# Patient Record
Sex: FEMALE | Race: WHITE | NOT HISPANIC OR LATINO | Employment: FULL TIME | URBAN - METROPOLITAN AREA
[De-identification: names, ages, dates, MRNs, and addresses within clinical notes are randomized per-mention and may not be internally consistent; named-entity substitution may affect disease eponyms.]

---

## 2020-10-08 PROBLEM — N94.6 DYSMENORRHEA: Status: ACTIVE | Noted: 2020-10-08

## 2020-10-08 PROBLEM — Z01.419 WOMEN'S ANNUAL ROUTINE GYNECOLOGICAL EXAMINATION: Status: ACTIVE | Noted: 2020-10-08

## 2020-10-08 PROBLEM — N94.10 DYSPAREUNIA IN FEMALE: Status: ACTIVE | Noted: 2020-10-08

## 2020-10-08 PROBLEM — N92.0 MENORRHAGIA WITH REGULAR CYCLE: Status: ACTIVE | Noted: 2020-10-08

## 2020-10-28 PROBLEM — N81.4 UTERINE PROLAPSE: Status: ACTIVE | Noted: 2020-10-28

## 2020-11-06 ENCOUNTER — HOSPITAL ENCOUNTER (OUTPATIENT)
Dept: PREADMISSION TESTING | Facility: HOSPITAL | Age: 25
Discharge: HOME | End: 2020-11-06
Attending: OBSTETRICS & GYNECOLOGY
Payer: MEDICAID

## 2020-11-06 DIAGNOSIS — Z01.818 PREOP TESTING: Primary | ICD-10-CM

## 2020-11-06 NOTE — PRE-PROCEDURE INSTRUCTIONS
PAT done. Pt ambulatory, alert and oriented. Discussed pre, postop, and discharge instructions. hibicleanse given with instructions. Npo after mn. Need . Arrive at main entrance.  Cov[facility] Labs ekg today. Anesthesia unavailable.  Pt escorted to lab. No questions or concerns at this time.o700 arrival time

## 2020-11-09 ENCOUNTER — ANESTHESIA (OUTPATIENT)
Dept: SURGERY | Facility: HOSPITAL | Age: 25
End: 2020-11-09
Payer: MEDICAID

## 2020-11-09 ENCOUNTER — ANESTHESIA EVENT (OUTPATIENT)
Dept: SURGERY | Facility: HOSPITAL | Age: 25
End: 2020-11-09
Payer: MEDICAID

## 2020-11-09 ENCOUNTER — HOSPITAL ENCOUNTER (OUTPATIENT)
Facility: HOSPITAL | Age: 25
Discharge: HOME | End: 2020-11-10
Attending: OBSTETRICS & GYNECOLOGY | Admitting: OBSTETRICS & GYNECOLOGY
Payer: MEDICAID

## 2020-11-09 DIAGNOSIS — N94.6 DYSMENORRHEA: ICD-10-CM

## 2020-11-09 DIAGNOSIS — Z01.818 PREOP TESTING: ICD-10-CM

## 2020-11-09 DIAGNOSIS — R10.2 PELVIC PAIN: Primary | ICD-10-CM

## 2020-11-09 LAB — B-HCG UR QL: NEGATIVE

## 2020-11-09 PROCEDURE — 63600175 PHARM REV CODE 636 W HCPCS

## 2020-11-09 PROCEDURE — 63600175 PHARM REV CODE 636 W HCPCS: Performed by: ANESTHESIOLOGY

## 2020-11-09 PROCEDURE — 63600175 PHARM REV CODE 636 W HCPCS: Performed by: OBSTETRICS & GYNECOLOGY

## 2020-11-09 PROCEDURE — 00840 ANES IPER PX LOWER ABD NOS: CPT | Performed by: OBSTETRICS & GYNECOLOGY

## 2020-11-09 PROCEDURE — 88307 TISSUE EXAM BY PATHOLOGIST: CPT | Mod: 26,,, | Performed by: PATHOLOGY

## 2020-11-09 PROCEDURE — 71000039 HC RECOVERY, EACH ADD'L HOUR: Performed by: OBSTETRICS & GYNECOLOGY

## 2020-11-09 PROCEDURE — 36000710: Performed by: OBSTETRICS & GYNECOLOGY

## 2020-11-09 PROCEDURE — D9220A PRA ANESTHESIA: ICD-10-PCS | Mod: ,,, | Performed by: ANESTHESIOLOGY

## 2020-11-09 PROCEDURE — 81025 URINE PREGNANCY TEST: CPT

## 2020-11-09 PROCEDURE — 99900103 DSU ONLY-NO CHARGE-INITIAL HR (STAT)

## 2020-11-09 PROCEDURE — 88307 PR  SURG PATH,LEVEL V: ICD-10-PCS | Mod: 26,,, | Performed by: PATHOLOGY

## 2020-11-09 PROCEDURE — 94799 UNLISTED PULMONARY SVC/PX: CPT

## 2020-11-09 PROCEDURE — 63600175 PHARM REV CODE 636 W HCPCS: Performed by: NURSE ANESTHETIST, CERTIFIED REGISTERED

## 2020-11-09 PROCEDURE — 37000009 HC ANESTHESIA EA ADD 15 MINS: Performed by: OBSTETRICS & GYNECOLOGY

## 2020-11-09 PROCEDURE — 25000003 PHARM REV CODE 250: Performed by: OBSTETRICS & GYNECOLOGY

## 2020-11-09 PROCEDURE — 87086 URINE CULTURE/COLONY COUNT: CPT

## 2020-11-09 PROCEDURE — 36000711: Performed by: OBSTETRICS & GYNECOLOGY

## 2020-11-09 PROCEDURE — 25000003 PHARM REV CODE 250: Performed by: NURSE ANESTHETIST, CERTIFIED REGISTERED

## 2020-11-09 PROCEDURE — 88307 TISSUE EXAM BY PATHOLOGIST: CPT | Performed by: PATHOLOGY

## 2020-11-09 PROCEDURE — 71000033 HC RECOVERY, INTIAL HOUR: Performed by: OBSTETRICS & GYNECOLOGY

## 2020-11-09 PROCEDURE — 37000008 HC ANESTHESIA 1ST 15 MINUTES: Performed by: OBSTETRICS & GYNECOLOGY

## 2020-11-09 PROCEDURE — D9220A PRA ANESTHESIA: Mod: ,,, | Performed by: ANESTHESIOLOGY

## 2020-11-09 PROCEDURE — 27201423 OPTIME MED/SURG SUP & DEVICES STERILE SUPPLY: Performed by: OBSTETRICS & GYNECOLOGY

## 2020-11-09 RX ORDER — SODIUM CHLORIDE, SODIUM LACTATE, POTASSIUM CHLORIDE, CALCIUM CHLORIDE 600; 310; 30; 20 MG/100ML; MG/100ML; MG/100ML; MG/100ML
INJECTION, SOLUTION INTRAVENOUS CONTINUOUS
Status: DISCONTINUED | OUTPATIENT
Start: 2020-11-09 | End: 2020-11-10 | Stop reason: HOSPADM

## 2020-11-09 RX ORDER — SODIUM CHLORIDE, SODIUM LACTATE, POTASSIUM CHLORIDE, CALCIUM CHLORIDE 600; 310; 30; 20 MG/100ML; MG/100ML; MG/100ML; MG/100ML
INJECTION, SOLUTION INTRAVENOUS CONTINUOUS
Status: DISCONTINUED | OUTPATIENT
Start: 2020-11-09 | End: 2020-11-09

## 2020-11-09 RX ORDER — PROPOFOL 10 MG/ML
VIAL (ML) INTRAVENOUS
Status: DISCONTINUED | OUTPATIENT
Start: 2020-11-09 | End: 2020-11-09

## 2020-11-09 RX ORDER — ONDANSETRON 4 MG/1
8 TABLET, ORALLY DISINTEGRATING ORAL EVERY 8 HOURS PRN
Status: DISCONTINUED | OUTPATIENT
Start: 2020-11-09 | End: 2020-11-10 | Stop reason: HOSPADM

## 2020-11-09 RX ORDER — CEFAZOLIN SODIUM 2 G/50ML
2 SOLUTION INTRAVENOUS
Status: COMPLETED | OUTPATIENT
Start: 2020-11-09 | End: 2020-11-09

## 2020-11-09 RX ORDER — KETOROLAC TROMETHAMINE 30 MG/ML
30 INJECTION, SOLUTION INTRAMUSCULAR; INTRAVENOUS EVERY 6 HOURS
Status: DISCONTINUED | OUTPATIENT
Start: 2020-11-09 | End: 2020-11-10 | Stop reason: HOSPADM

## 2020-11-09 RX ORDER — SODIUM CHLORIDE, SODIUM LACTATE, POTASSIUM CHLORIDE, CALCIUM CHLORIDE 600; 310; 30; 20 MG/100ML; MG/100ML; MG/100ML; MG/100ML
125 INJECTION, SOLUTION INTRAVENOUS CONTINUOUS
Status: DISCONTINUED | OUTPATIENT
Start: 2020-11-09 | End: 2020-11-09

## 2020-11-09 RX ORDER — MEPERIDINE HYDROCHLORIDE 50 MG/ML
INJECTION INTRAMUSCULAR; INTRAVENOUS; SUBCUTANEOUS
Status: DISCONTINUED | OUTPATIENT
Start: 2020-11-09 | End: 2020-11-09

## 2020-11-09 RX ORDER — AMOXICILLIN 250 MG
1 CAPSULE ORAL 2 TIMES DAILY
Status: DISCONTINUED | OUTPATIENT
Start: 2020-11-09 | End: 2020-11-10 | Stop reason: HOSPADM

## 2020-11-09 RX ORDER — MORPHINE SULFATE 4 MG/ML
6 INJECTION, SOLUTION INTRAMUSCULAR; INTRAVENOUS EVERY 4 HOURS PRN
Status: DISCONTINUED | OUTPATIENT
Start: 2020-11-09 | End: 2020-11-10 | Stop reason: HOSPADM

## 2020-11-09 RX ORDER — BISACODYL 10 MG
10 SUPPOSITORY, RECTAL RECTAL DAILY PRN
Status: DISCONTINUED | OUTPATIENT
Start: 2020-11-09 | End: 2020-11-10 | Stop reason: HOSPADM

## 2020-11-09 RX ORDER — LIDOCAINE HYDROCHLORIDE 10 MG/ML
1 INJECTION, SOLUTION EPIDURAL; INFILTRATION; INTRACAUDAL; PERINEURAL ONCE
Status: DISCONTINUED | OUTPATIENT
Start: 2020-11-09 | End: 2020-11-09 | Stop reason: HOSPADM

## 2020-11-09 RX ORDER — SUCCINYLCHOLINE CHLORIDE 20 MG/ML
INJECTION INTRAMUSCULAR; INTRAVENOUS
Status: DISCONTINUED | OUTPATIENT
Start: 2020-11-09 | End: 2020-11-09

## 2020-11-09 RX ORDER — IPRATROPIUM BROMIDE 0.5 MG/2.5ML
0.5 SOLUTION RESPIRATORY (INHALATION) EVERY 6 HOURS
Status: DISCONTINUED | OUTPATIENT
Start: 2020-11-09 | End: 2020-11-09 | Stop reason: HOSPADM

## 2020-11-09 RX ORDER — MUPIROCIN 20 MG/G
1 OINTMENT TOPICAL 2 TIMES DAILY
Status: DISCONTINUED | OUTPATIENT
Start: 2020-11-09 | End: 2020-11-10 | Stop reason: HOSPADM

## 2020-11-09 RX ORDER — ONDANSETRON 2 MG/ML
8 INJECTION INTRAMUSCULAR; INTRAVENOUS ONCE
Status: COMPLETED | OUTPATIENT
Start: 2020-11-09 | End: 2020-11-09

## 2020-11-09 RX ORDER — MIDAZOLAM HYDROCHLORIDE 1 MG/ML
INJECTION, SOLUTION INTRAMUSCULAR; INTRAVENOUS
Status: DISCONTINUED | OUTPATIENT
Start: 2020-11-09 | End: 2020-11-09

## 2020-11-09 RX ORDER — ONDANSETRON 2 MG/ML
INJECTION INTRAMUSCULAR; INTRAVENOUS
Status: COMPLETED
Start: 2020-11-09 | End: 2020-11-09

## 2020-11-09 RX ORDER — BUPIVACAINE HYDROCHLORIDE AND EPINEPHRINE 2.5; 5 MG/ML; UG/ML
INJECTION, SOLUTION EPIDURAL; INFILTRATION; INTRACAUDAL; PERINEURAL
Status: DISCONTINUED | OUTPATIENT
Start: 2020-11-09 | End: 2020-11-09 | Stop reason: HOSPADM

## 2020-11-09 RX ORDER — ONDANSETRON 2 MG/ML
INJECTION INTRAMUSCULAR; INTRAVENOUS
Status: DISCONTINUED | OUTPATIENT
Start: 2020-11-09 | End: 2020-11-09

## 2020-11-09 RX ORDER — OXYCODONE AND ACETAMINOPHEN 5; 325 MG/1; MG/1
1 TABLET ORAL
Status: DISCONTINUED | OUTPATIENT
Start: 2020-11-09 | End: 2020-11-09 | Stop reason: HOSPADM

## 2020-11-09 RX ORDER — ONDANSETRON 2 MG/ML
4 INJECTION INTRAMUSCULAR; INTRAVENOUS DAILY PRN
Status: DISCONTINUED | OUTPATIENT
Start: 2020-11-09 | End: 2020-11-09 | Stop reason: HOSPADM

## 2020-11-09 RX ORDER — ROCURONIUM BROMIDE 10 MG/ML
INJECTION, SOLUTION INTRAVENOUS
Status: DISCONTINUED | OUTPATIENT
Start: 2020-11-09 | End: 2020-11-09

## 2020-11-09 RX ORDER — IBUPROFEN 600 MG/1
600 TABLET ORAL EVERY 6 HOURS
Status: DISCONTINUED | OUTPATIENT
Start: 2020-11-10 | End: 2020-11-10 | Stop reason: HOSPADM

## 2020-11-09 RX ORDER — MORPHINE SULFATE 4 MG/ML
2 INJECTION, SOLUTION INTRAMUSCULAR; INTRAVENOUS EVERY 5 MIN PRN
Status: DISCONTINUED | OUTPATIENT
Start: 2020-11-09 | End: 2020-11-09 | Stop reason: HOSPADM

## 2020-11-09 RX ORDER — CEFAZOLIN SODIUM 2 G/50ML
SOLUTION INTRAVENOUS
Status: COMPLETED
Start: 2020-11-09 | End: 2020-11-09

## 2020-11-09 RX ORDER — MORPHINE SULFATE 4 MG/ML
8 INJECTION, SOLUTION INTRAMUSCULAR; INTRAVENOUS
Status: DISCONTINUED | OUTPATIENT
Start: 2020-11-09 | End: 2020-11-10 | Stop reason: HOSPADM

## 2020-11-09 RX ADMIN — MIDAZOLAM HYDROCHLORIDE 1 MG: 1 INJECTION, SOLUTION INTRAMUSCULAR; INTRAVENOUS at 07:11

## 2020-11-09 RX ADMIN — ROCURONIUM BROMIDE 5 MG: 10 INJECTION, SOLUTION INTRAVENOUS at 09:11

## 2020-11-09 RX ADMIN — SUCCINYLCHOLINE CHLORIDE 120 MG: 20 INJECTION, SOLUTION INTRAMUSCULAR; INTRAVENOUS at 07:11

## 2020-11-09 RX ADMIN — MEPERIDINE HYDROCHLORIDE 10 MG: 50 INJECTION INTRAMUSCULAR; INTRAVENOUS; SUBCUTANEOUS at 07:11

## 2020-11-09 RX ADMIN — SODIUM CHLORIDE, POTASSIUM CHLORIDE, SODIUM LACTATE AND CALCIUM CHLORIDE: 600; 310; 30; 20 INJECTION, SOLUTION INTRAVENOUS at 08:11

## 2020-11-09 RX ADMIN — PROPOFOL 150 MG: 10 INJECTION, EMULSION INTRAVENOUS at 07:11

## 2020-11-09 RX ADMIN — MORPHINE SULFATE 2 MG: 4 INJECTION INTRAVENOUS at 11:11

## 2020-11-09 RX ADMIN — ONDANSETRON 8 MG: 2 INJECTION INTRAMUSCULAR; INTRAVENOUS at 04:11

## 2020-11-09 RX ADMIN — MEPERIDINE HYDROCHLORIDE 15 MG: 50 INJECTION INTRAMUSCULAR; INTRAVENOUS; SUBCUTANEOUS at 08:11

## 2020-11-09 RX ADMIN — ROCURONIUM BROMIDE 10 MG: 10 INJECTION, SOLUTION INTRAVENOUS at 08:11

## 2020-11-09 RX ADMIN — SUGAMMADEX 200 MG: 100 INJECTION, SOLUTION INTRAVENOUS at 09:11

## 2020-11-09 RX ADMIN — CEFAZOLIN SODIUM 2 G: 2 SOLUTION INTRAVENOUS at 07:11

## 2020-11-09 RX ADMIN — MORPHINE SULFATE 4 MG: 4 INJECTION INTRAVENOUS at 10:11

## 2020-11-09 RX ADMIN — PROPOFOL 50 MG: 10 INJECTION, EMULSION INTRAVENOUS at 07:11

## 2020-11-09 RX ADMIN — SODIUM CHLORIDE, POTASSIUM CHLORIDE, SODIUM LACTATE AND CALCIUM CHLORIDE: 600; 310; 30; 20 INJECTION, SOLUTION INTRAVENOUS at 07:11

## 2020-11-09 RX ADMIN — MEPERIDINE HYDROCHLORIDE 25 MG: 50 INJECTION INTRAMUSCULAR; INTRAVENOUS; SUBCUTANEOUS at 09:11

## 2020-11-09 RX ADMIN — ROCURONIUM BROMIDE 20 MG: 10 INJECTION, SOLUTION INTRAVENOUS at 08:11

## 2020-11-09 RX ADMIN — PROMETHAZINE HYDROCHLORIDE 12.5 MG: 25 INJECTION INTRAMUSCULAR; INTRAVENOUS at 11:11

## 2020-11-09 RX ADMIN — ONDANSETRON 4 MG: 2 INJECTION INTRAMUSCULAR; INTRAVENOUS at 10:11

## 2020-11-09 RX ADMIN — SODIUM CHLORIDE, SODIUM LACTATE, POTASSIUM CHLORIDE, AND CALCIUM CHLORIDE: .6; .31; .03; .02 INJECTION, SOLUTION INTRAVENOUS at 04:11

## 2020-11-09 RX ADMIN — KETOROLAC TROMETHAMINE 30 MG: 30 INJECTION, SOLUTION INTRAMUSCULAR at 11:11

## 2020-11-09 RX ADMIN — MORPHINE SULFATE 6 MG: 4 INJECTION, SOLUTION INTRAMUSCULAR; INTRAVENOUS at 01:11

## 2020-11-09 RX ADMIN — ONDANSETRON HYDROCHLORIDE 8 MG: 2 SOLUTION INTRAMUSCULAR; INTRAVENOUS at 04:11

## 2020-11-09 RX ADMIN — ONDANSETRON 4 MG: 2 INJECTION INTRAMUSCULAR; INTRAVENOUS at 09:11

## 2020-11-09 RX ADMIN — MUPIROCIN 1 G: 20 OINTMENT TOPICAL at 10:11

## 2020-11-09 NOTE — HPI
24 yo   Pessary improved the pain   Now on menses w increased discomfort  Does not like using pessary w insertion and removal        emb extensive breakdown  Us 1.2 cm polyp  Wbc 9.4 hct 33, pap nl , gc chlneg, affirm neg  Menorrhagia, dysmenorrhea and dysparunia.  Sp cs x3  No tob

## 2020-11-09 NOTE — PLAN OF CARE
Patient arrives to PACU via bed resting quietly. monitor connected. PIV intact and infusing LR'S to gravity. Incision sites to abdomen x 4 bandage in place clean and dry. Ernandez cath in place secured to left thigh. Bear hugger at bedside. VS'S, No complaints, will continue to monitor.

## 2020-11-09 NOTE — OP NOTE
Surgery Date: 11/9/2020      Surgeon(s) and Role:     * Jhonny Rebollar MD - Primary     * Pam Whittington MD - Assisting           Pre-op Diagnosis:  Dysmenorrhea [N94.6]  Dyspareunia in female [N94.10]  Menorrhagia with regular cycle [N92.0]  Uterine prolapse [N81.4]     Post-op Diagnosis:  Post-Op Diagnosis Codes:     * Dysmenorrhea [N94.6]     * Dyspareunia in female [N94.10]     * Menorrhagia with regular cycle [N92.0]     * Uterine prolapse [N81.4]     Procedure(s) (LRB):  HYSTERECTOMY, VAGINAL, LAPAROSCOPY-ASSISTED (N/A)  CYSTOSCOPY (N/A)     Anesthesia: Choice     Description of Procedure: tlh, bilateral salpingectomy, cystoscopy     Description of the findings of the procedure: normal pelvic anatomy, liver and appendix.  Normal cystoscopic exam of the bladder     Estimated Blood Loss: 200 mL     Estimated Blood Loss has not been documented. EBL = 200.         Specimens: uterus w cervix, bilateral tubes      After proper consents were obtained the patient was taken to the operating given general anesthesia through endotracheal intubation prepped and draped in the usual fashion for laparoscopic hysterectomy.  She was placed in dorsal lithotomy position in Guillermo stirrups  Ernandez was placed in the bladder  Ten tooth tenaculum grasped the anterior lip of the cervix, uterus sounded to 10 cm, and a 10 cm fornices device with a 5 cm cup was then placed into the uterus and onto the cervix  An incision was made in the midclavicular line just beneath the costochondral margin on the left side  The abdomen was insufflated with CO2  No adhesions to the abdominal wall anteriorly were noted  10 mm incision was made with a scalpel at the umbilicus  A 10 mm scope was placed at the umbilicus  Under direct visualization 5 mm trocars were placed in the left and the right lower quadrant staying lateral to the inferior epigastric vessels  Liver appendix cul-de-sac tubes and ovaries were normal  Prior tubal ligation was noted  bilaterally  Ovaries were normal  The mesial salpinx was sequentially cauterized and cut with LigaSure device  And cross incised and removed separately through the trocar sleeves  The round ligaments were cauterized and cut  The rectovaginal space was opened under blunt dissection  Anterior leaf and posterior leaf of the broad ligament were sequentially cauterized and cut to the lower uterine segment  Uterine vessels were sequentially cauterized and cut with LigaSure device  Uterine arteries were triply cauterized and cut with LigaSure device   there was minimal adhesions on the vesicouterine fold  The bladder was taken down by cauterization  After the bladder was instilled with 180 cc of air through the Ernandez  To identify its demarcation on the lower uterine segment  The air was released from the bladder  And a circumferential incision was made at the cervical vaginal junction using the fornices  Cup as a line of demarcation  The specimen was removed from the vagina  The vaginal mucosa was closed with 0 Vicryl running interlocking fashion and imbricated with 0 Vicryl  The abdomen was reinsufflated  Pedicles were hemostatic  Surgicel was placed on the vaginal cuff  Abdomen was deinsufflated primary and secondary trocars were removed under direct visualization  Subumbilical incision suture was 0 Vicryl at the fascial level  Skin closed with 4 Vicryl  Left needle sponge instrument needle count correct x2

## 2020-11-09 NOTE — BRIEF OP NOTE
[facility] - Periop Services  Brief Operative Note    SUMMARY     Surgery Date: 11/9/2020     Surgeon(s) and Role:     * Jhonny Rebollar MD - Primary     * Pam Whittington MD - Assisting        Pre-op Diagnosis:  Dysmenorrhea [N94.6]  Dyspareunia in female [N94.10]  Menorrhagia with regular cycle [N92.0]  Uterine prolapse [N81.4]    Post-op Diagnosis:  Post-Op Diagnosis Codes:     * Dysmenorrhea [N94.6]     * Dyspareunia in female [N94.10]     * Menorrhagia with regular cycle [N92.0]     * Uterine prolapse [N81.4]    Procedure(s) (LRB):  HYSTERECTOMY, VAGINAL, LAPAROSCOPY-ASSISTED (N/A)  CYSTOSCOPY (N/A)    Anesthesia: Choice    Description of Procedure: tlh, bilateral salpingectomy, cystoscopy    Description of the findings of the procedure: normal pelvic anatomy, liver and appendix.  Normal cystoscopic exam of the bladder    Estimated Blood Loss: 200 mL    Estimated Blood Loss has not been documented. EBL = 200.         Specimens: uterus w cervix, bilateral tubes  Specimen (12h ago, onward)    None          [id number]

## 2020-11-09 NOTE — ANESTHESIA PREPROCEDURE EVALUATION
11/09/2020  Krystin Centeno is a 25 y.o., female.    Anesthesia Evaluation    I have reviewed the Patient Summary Reports.    I have reviewed the Nursing Notes.    I have reviewed the Medications.     Review of Systems  Anesthesia Hx:  No problems with previous Anesthesia  Neg history of prior surgery. Denies Family Hx of Anesthesia complications.   Denies Personal Hx of Anesthesia complications.   Social:  Non-Smoker    Hematology/Oncology:  Hematology Normal   Oncology Normal     EENT/Dental:EENT/Dental Normal   Cardiovascular:  Cardiovascular Normal     Pulmonary:  Pulmonary Normal    Renal/:  Renal/ Normal     Hepatic/GI:  Hepatic/GI Normal    Musculoskeletal:  Musculoskeletal Normal    Neurological:  Neurology Normal    Endocrine:  Endocrine Normal    Dermatological:  Skin Normal    Psych:  Psychiatric Normal           Physical Exam  General:  Obesity    Airway/Jaw/Neck:  Airway Findings: Mouth Opening: Normal Tongue: Normal  General Airway Assessment: Adult  Mallampati: II  TM Distance: 4 - 6 cm        Eyes/Ears/Nose:  EYES/EARS/NOSE FINDINGS: Normal   Dental:  DENTAL FINDINGS: Normal   Chest/Lungs:  Chest/Lungs Clear    Heart/Vascular:  Heart Findings: Normal Heart murmur: negative Vascular Findings: Normal    Abdomen:  Abdomen Findings: Normal    Musculoskeletal:  Musculoskeletal Findings: Normal   Skin:  Skin Findings: Normal    Mental Status:  Mental Status Findings: Normal        Anesthesia Plan  Type of Anesthesia, risks & benefits discussed:  Anesthesia Type:  general  Patient's Preference:   Intra-op Monitoring Plan: standard ASA monitors  Intra-op Monitoring Plan Comments:   Post Op Pain Control Plan:   Post Op Pain Control Plan Comments:   Induction:   IV  Beta Blocker:  Patient is not currently on a Beta-Blocker (No further documentation required).       Informed Consent: Patient  understands risks and agrees with Anesthesia plan.  Questions answered. Anesthesia consent signed with patient.  ASA Score: 2     Day of Surgery Review of History & Physical: I have interviewed and examined the patient. I have reviewed the patient's H&P dated:    H&P update referred to the provider.         Ready For Surgery From Anesthesia Perspective.

## 2020-11-09 NOTE — ANESTHESIA PROCEDURE NOTES
Intubation  Performed by: Sweta Aguilar CRNA  Authorized by: Shashank Ortiz MD     Intubation:     Induction:  Rapid sequence induction    Intubated:  Postinduction    Mask Ventilation:  Easy mask    Attempts:  1    Attempted By:  CRNA    Method of Intubation:  Direct    Blade:  Other (see comments) (bowman 2)    Laryngeal View Grade: Grade I - full view of chords      Difficult Airway Encountered?: No      Complications:  None    Airway Device:  Oral endotracheal tube    Airway Device Size:  7.0    Style/Cuff Inflation:  Cuffed (inflated to minimal occlusive pressure)    Tube secured:  21    Secured at:  The lips    Placement Verified By:  Capnometry    Complicating Factors:  Obesity    Findings Post-Intubation:  Atraumatic/condition of teeth unchanged and BS equal bilateral

## 2020-11-09 NOTE — ANESTHESIA POSTPROCEDURE EVALUATION
Anesthesia Post Evaluation    Patient: Krystin Centeno    Procedure(s) Performed: Procedure(s) (LRB):  HYSTERECTOMY, VAGINAL, LAPAROSCOPY-ASSISTED (N/A)  CYSTOSCOPY (N/A)    Final Anesthesia Type: general    Patient location during evaluation: PACU  Patient participation: Yes- Able to Participate  Level of consciousness: awake and awake and alert  Post-procedure vital signs: reviewed and stable  Pain management: adequate  Airway patency: patent    PONV status at discharge: No PONV  Anesthetic complications: no      Cardiovascular status: blood pressure returned to baseline  Respiratory status: unassisted and spontaneous ventilation  Hydration status: euvolemic  Follow-up not needed.          Vitals Value Taken Time   /68 11/09/20 1101   Temp 36.5 °C (97.7 °F) 11/09/20 1010   Pulse 79 11/09/20 1104   Resp 16 11/09/20 1104   SpO2 95 % 11/09/20 1104   Vitals shown include unvalidated device data.      No case tracking events are documented in the log.      Pain/Dora Score: Pain Rating Prior to Med Admin: 5 (11/9/2020  1:44 PM)  Pain Rating Post Med Admin: 5 (11/9/2020  1:00 PM)  Dora Score: 10 (11/9/2020 11:00 AM)

## 2020-11-09 NOTE — SUBJECTIVE & OBJECTIVE
No current facility-administered medications on file prior to encounter.      Current Outpatient Medications on File Prior to Encounter   Medication Sig    iron-vitamin C 100-250 mg, ICAR-C, 100-250 mg Tab Take 1 tablet by mouth every morning.    diazePAM (VALIUM) 5 MG tablet Take 1 tablet (5 mg total) by mouth every 8 (eight) hours as needed for Anxiety or Insomnia.    HYDROcodone-acetaminophen (NORCO) 5-325 mg per tablet Take 1 tablet by mouth every 4 (four) hours as needed for Pain.       Review of patient's allergies indicates:  No Known Allergies    Past Medical History:   Diagnosis Date    Anemia     Ovarian cyst      Past Surgical History:   Procedure Laterality Date     SECTION      TUBAL LIGATION       Family History     Problem Relation (Age of Onset)    Diabetes Maternal Grandmother    Skin cancer Maternal Grandmother        Tobacco Use    Smoking status: Never Smoker    Smokeless tobacco: Never Used   Substance and Sexual Activity    Alcohol use: Yes     Comment: occasionally    Drug use: Never    Sexual activity: Yes     Partners: Male     Birth control/protection: See Surgical Hx, None     Review of Systems   Constitutional: Negative.    HENT: Negative.    Eyes: Negative.    Respiratory: Negative.    Cardiovascular: Negative.    Gastrointestinal: Negative.    Endocrine: Negative.    Genitourinary: Negative.    Musculoskeletal: Negative.    Skin: Negative.    Allergic/Immunologic: Negative.    Neurological: Negative.    Hematological: Negative.    Psychiatric/Behavioral: Negative.      Objective:     Vital Signs (Most Recent):    Vital Signs (24h Range):        Weight: 89.8 kg (198 lb)  Body mass index is 35.07 kg/m².    Physical Exam  Vitals signs reviewed. Exam conducted with a chaperone present.   Constitutional:       Appearance: Normal appearance.   HENT:      Head: Normocephalic.      Nose: Nose normal.      Mouth/Throat:      Mouth: Mucous membranes are moist.   Eyes:       Pupils: Pupils are equal, round, and reactive to light.   Neck:      Musculoskeletal: Normal range of motion.   Cardiovascular:      Rate and Rhythm: Normal rate and regular rhythm.   Pulmonary:      Effort: Pulmonary effort is normal.      Breath sounds: Normal breath sounds.   Chest:      Breasts:         Right: Normal.         Left: Normal.   Abdominal:      General: Abdomen is flat.      Palpations: Abdomen is soft.   Genitourinary:     General: Normal vulva.      Exam position: Lithotomy position.      Vagina: Normal.      Cervix: Normal.      Uterus: Normal. With uterine prolapse.       Adnexa: Right adnexa normal and left adnexa normal.   Musculoskeletal: Normal range of motion.   Skin:     General: Skin is warm.   Neurological:      General: No focal deficit present.      Mental Status: She is alert.   Psychiatric:         Mood and Affect: Mood normal.         Significant Labs:  CBC:   Recent Labs   Lab 11/06/20  1320   WBC 7.46   RBC 4.30   HGB 10.5*   HCT 35.4*      MCV 82   MCH 24.4*   MCHC 29.7*       Significant Diagnostics:  rev

## 2020-11-09 NOTE — H&P
[facility] - Periop Services  General Surgery  History & Physical    Patient Name: Krystin Centeno  MRN: [id number]  Admission Date: (Not on file)  Attending Physician: Jhonny Rebollar MD   Primary Care Provider: Anson Bravo MD    Patient information was obtained from patient and ER records.     Subjective:     Chief Complaint/Reason for Admission: pelvic pain    History of Present Illness:  26 yo   Pessary improved the pain   Now on menses w increased discomfort  Does not like using pessary w insertion and removal        emb extensive breakdown  Us 1.2 cm polyp  Wbc 9.4 hct 33, pap nl , gc chlneg, affirm neg  Menorrhagia, dysmenorrhea and dysparunia.  Sp cs x3  No tob    No current facility-administered medications on file prior to encounter.      Current Outpatient Medications on File Prior to Encounter   Medication Sig    iron-vitamin C 100-250 mg, ICAR-C, 100-250 mg Tab Take 1 tablet by mouth every morning.    diazePAM (VALIUM) 5 MG tablet Take 1 tablet (5 mg total) by mouth every 8 (eight) hours as needed for Anxiety or Insomnia.    HYDROcodone-acetaminophen (NORCO) 5-325 mg per tablet Take 1 tablet by mouth every 4 (four) hours as needed for Pain.       Review of patient's allergies indicates:  No Known Allergies    Past Medical History:   Diagnosis Date    Anemia     Ovarian cyst      Past Surgical History:   Procedure Laterality Date     SECTION      TUBAL LIGATION       Family History     Problem Relation (Age of Onset)    Diabetes Maternal Grandmother    Skin cancer Maternal Grandmother        Tobacco Use    Smoking status: Never Smoker    Smokeless tobacco: Never Used   Substance and Sexual Activity    Alcohol use: Yes     Comment: occasionally    Drug use: Never    Sexual activity: Yes     Partners: Male     Birth control/protection: See Surgical Hx, None     Review of Systems   Constitutional: Negative.    HENT: Negative.    Eyes: Negative.    Respiratory:  Negative.    Cardiovascular: Negative.    Gastrointestinal: Negative.    Endocrine: Negative.    Genitourinary: Negative.    Musculoskeletal: Negative.    Skin: Negative.    Allergic/Immunologic: Negative.    Neurological: Negative.    Hematological: Negative.    Psychiatric/Behavioral: Negative.      Objective:     Vital Signs (Most Recent):    Vital Signs (24h Range):        Weight: 89.8 kg (198 lb)  Body mass index is 35.07 kg/m².    Physical Exam  Vitals signs reviewed. Exam conducted with a chaperone present.   Constitutional:       Appearance: Normal appearance.   HENT:      Head: Normocephalic.      Nose: Nose normal.      Mouth/Throat:      Mouth: Mucous membranes are moist.   Eyes:      Pupils: Pupils are equal, round, and reactive to light.   Neck:      Musculoskeletal: Normal range of motion.   Cardiovascular:      Rate and Rhythm: Normal rate and regular rhythm.   Pulmonary:      Effort: Pulmonary effort is normal.      Breath sounds: Normal breath sounds.   Chest:      Breasts:         Right: Normal.         Left: Normal.   Abdominal:      General: Abdomen is flat.      Palpations: Abdomen is soft.   Genitourinary:     General: Normal vulva.      Exam position: Lithotomy position.      Vagina: Normal.      Cervix: Normal.      Uterus: Normal. With uterine prolapse.       Adnexa: Right adnexa normal and left adnexa normal.   Musculoskeletal: Normal range of motion.   Skin:     General: Skin is warm.   Neurological:      General: No focal deficit present.      Mental Status: She is alert.   Psychiatric:         Mood and Affect: Mood normal.         Significant Labs:  CBC:   Recent Labs   Lab 11/06/20  1320   WBC 7.46   RBC 4.30   HGB 10.5*   HCT 35.4*      MCV 82   MCH 24.4*   MCHC 29.7*       Significant Diagnostics:  rev    Assessment/Plan:   Pelvic pain  Uterine prolapse, mild  Dysmenorrhea  dysparunia  Uterine polyp, suspected    Plan- tlh, cstoscopy w possible bso    No notes have been filed  under this hospital service.  Service: General Surgery    VTE Risk Mitigation (From admission, onward)    None          Jhonny Rebollar MD  General Surgery  [facility] - Periop Services

## 2020-11-09 NOTE — TRANSFER OF CARE
"Anesthesia Transfer of Care Note    Patient: Krystin Centeno    Procedure(s) Performed: Procedure(s) (LRB):  HYSTERECTOMY, VAGINAL, LAPAROSCOPY-ASSISTED (N/A)  CYSTOSCOPY (N/A)    Patient location: PACU    Anesthesia Type: general    Transport from OR: Transported from OR on room air with adequate spontaneous ventilation    Post pain: adequate analgesia    Post assessment: no apparent anesthetic complications and tolerated procedure well    Post vital signs: stable    Level of consciousness: awake, oriented and responds to stimulation    Nausea/Vomiting: no nausea/vomiting    Complications: none    Transfer of care protocol was followed      Last vitals:   Visit Vitals  /69   Pulse 71   Temp 36.4 °C (97.6 °F) (Oral)   Resp 16   Ht 5' 3" (1.6 m)   Wt 89.8 kg (198 lb)   LMP 11/09/2020   SpO2 98%   Breastfeeding No   BMI 35.07 kg/m²     "

## 2020-11-09 NOTE — NURSING
Patient called nurse to room at this time due to her incision on her right side draining.   bandaid was saturated and approximately 50 ml of pink tinged fluid noted on under pad.  Pad changed at this time and band aid was replaced.  Dr. wilson notified.  He stated it was just irrigation.  Patient was lying on her right side when it was leaking so she was instructed to try to lie on her back for a little while and we would watch it.  Patient v/u of all instructions.  Will continue to monitor.

## 2020-11-10 VITALS
DIASTOLIC BLOOD PRESSURE: 71 MMHG | OXYGEN SATURATION: 100 % | WEIGHT: 198 LBS | SYSTOLIC BLOOD PRESSURE: 117 MMHG | HEART RATE: 68 BPM | RESPIRATION RATE: 18 BRPM | HEIGHT: 63 IN | TEMPERATURE: 98 F | BODY MASS INDEX: 35.08 KG/M2

## 2020-11-10 PROCEDURE — 25000003 PHARM REV CODE 250: Performed by: OBSTETRICS & GYNECOLOGY

## 2020-11-10 PROCEDURE — 63600175 PHARM REV CODE 636 W HCPCS: Performed by: OBSTETRICS & GYNECOLOGY

## 2020-11-10 RX ORDER — IBUPROFEN 600 MG/1
600 TABLET ORAL EVERY 6 HOURS
Qty: 30 TABLET | Refills: 0 | Status: SHIPPED | OUTPATIENT
Start: 2020-11-10

## 2020-11-10 RX ORDER — DOCUSATE SODIUM 100 MG/1
100 CAPSULE, LIQUID FILLED ORAL 2 TIMES DAILY
Qty: 60 CAPSULE | Refills: 1 | Status: SHIPPED | OUTPATIENT
Start: 2020-11-10 | End: 2021-11-10

## 2020-11-10 RX ORDER — HYDROCODONE BITARTRATE AND ACETAMINOPHEN 7.5; 325 MG/1; MG/1
1 TABLET ORAL EVERY 6 HOURS PRN
Qty: 20 TABLET | Refills: 0 | Status: SHIPPED | OUTPATIENT
Start: 2020-11-10 | End: 2023-12-22

## 2020-11-10 RX ADMIN — SENNOSIDES AND DOCUSATE SODIUM 1 TABLET: 8.6; 5 TABLET ORAL at 08:11

## 2020-11-10 RX ADMIN — KETOROLAC TROMETHAMINE 30 MG: 30 INJECTION, SOLUTION INTRAMUSCULAR at 01:11

## 2020-11-10 RX ADMIN — MUPIROCIN 1 G: 20 OINTMENT TOPICAL at 08:11

## 2020-11-10 RX ADMIN — KETOROLAC TROMETHAMINE 30 MG: 30 INJECTION, SOLUTION INTRAMUSCULAR at 08:11

## 2020-11-10 RX ADMIN — SENNOSIDES AND DOCUSATE SODIUM 1 TABLET: 8.6; 5 TABLET ORAL at 01:11

## 2020-11-10 NOTE — NURSING
0971-Discharge instructions discussed with patient and copy provided. Instructed to f/u with Dr. Rebollar in 2 weeks. Prescription for Norco given to patient. Instructed to  prescription for Ibuprofen and Colace at pharmacy. Verbalized understanding. Denies any further questions or concerns at this time--LW.    0650-Ambulated to private vehicle per self with assistance from this RN. Mother to drive patient home. No s/s distress noted at time of discharge--LW.

## 2020-11-10 NOTE — DISCHARGE SUMMARY
[facility] -  Discharge Summary      Patient Name: Krystin Centeno  MRN: [id number]  Admission Date: 2020  Hospital Length of Stay: 0 days  Discharge Date and Time: 11/10/2020    Attending Physician: Jhonny Rebollar MD   Discharging Provider: Pam Whittington MD  Primary Care Provider: Anson Bravo MD    HPI: 24yo  with AUB, dysmenorrhea and dyspareunia who presented for minimally invasive hysterectomy and BS.     Procedure(s) (LRB):  HYSTERECTOMY, VAGINAL, LAPAROSCOPY-ASSISTED (N/A)  CYSTOSCOPY (N/A)     Hospital Course: She tolerated the procedure well. On POD1 she was meeting goals for discharge to home.         Final Active Diagnoses:    Diagnosis Date Noted POA    PRINCIPAL PROBLEM:  Pelvic pain [R10.2] 2020 Unknown    Dysmenorrhea [N94.6] 10/08/2020 Yes      Problems Resolved During this Admission:        Labs: CBC No results for input(s): WBC, HGB, HCT, PLT in the last 48 hours.      Pending Diagnostic Studies:     Procedure Component Value Units Date/Time    Hemoglobin and hematocrit, 6H Post-Op [[id number]]     Order Status: Sent Lab Status: No result     Specimen: Blood     Specimen to Pathology, Surgery Gynecology and Obstetrics [[id number]] Collected: 20 1003    Order Status: Sent Lab Status: In process Updated: 20 1142          Discharged Condition: good    Disposition: Home or Self Care    Follow Up:  Follow-up Information     Jhonny Reblolar MD In 2 weeks.    Specialty: Obstetrics and Gynecology  Why: post op  Contact information:  [address] [place] [id number] [phone]                 Patient Instructions:      COVID-19 Routine Screening   Standing Status: Future Number of Occurrences: 1 Standing Exp. Date: 22     Order Specific Question Answer Comments   Is the patient symptomatic? No    Is this needed for pre-procedure or pre-op testing? Yes    Diagnosis: Preop testing [[id number]]      Lifting restrictions     Pelvic  Rest     Notify your health care provider if you experience any of the following:  temperature >100.4     Notify your health care provider if you experience any of the following:  persistent nausea and vomiting or diarrhea     Notify your health care provider if you experience any of the following:  severe uncontrolled pain     Notify your health care provider if you experience any of the following:  redness, tenderness, or signs of infection (pain, swelling, redness, odor or green/yellow discharge around incision site)     Notify your health care provider if you experience any of the following:  difficulty breathing or increased cough     Notify your health care provider if you experience any of the following:  severe persistent headache     Notify your health care provider if you experience any of the following:  persistent dizziness, light-headedness, or visual disturbances     Notify your health care provider if you experience any of the following:  increased confusion or weakness     Activity as tolerated     Medications:  Current Discharge Medication List      START taking these medications    Details   docusate sodium (COLACE) 100 MG capsule Take 1 capsule (100 mg total) by mouth 2 (two) times daily.  Qty: 60 capsule, Refills: 1      HYDROcodone-acetaminophen (NORCO) 7.5-325 mg per tablet Take 1 tablet by mouth every 6 (six) hours as needed for Pain.  Qty: 20 tablet, Refills: 0    Comments: n/a       ibuprofen (ADVIL,MOTRIN) 600 MG tablet Take 1 tablet (600 mg total) by mouth every 6 (six) hours.  Qty: 30 tablet, Refills: 0         CONTINUE these medications which have NOT CHANGED    Details   iron-vitamin C 100-250 mg, ICAR-C, 100-250 mg Tab Take 1 tablet by mouth every morning.         STOP taking these medications       diazePAM (VALIUM) 5 MG tablet Comments:   Reason for Stopping:               Pam Whittington MD    [facility]

## 2020-11-10 NOTE — NURSING
Pt c/o left lower quad pain, sudden onset x 1 hour ago. Abd dressings are dry, abd soft. Requested assistance to bathroom, voiding without difficulty, back to bed. Declined pain med. Siderails up call light in reach. Report to Dinorah LIMA and Anup LIMA

## 2020-11-11 LAB
BACTERIA UR CULT: NO GROWTH
COMMENT: NORMAL
FINAL PATHOLOGIC DIAGNOSIS: NORMAL
GROSS: NORMAL
Lab: NORMAL

## 2020-11-12 ENCOUNTER — PROCEDURE VISIT (OUTPATIENT)
Dept: SLEEP MEDICINE | Facility: HOSPITAL | Age: 25
End: 2020-11-12
Attending: FAMILY MEDICINE
Payer: MEDICAID

## 2020-11-12 DIAGNOSIS — R53.83 FATIGUE: ICD-10-CM

## 2020-11-12 DIAGNOSIS — R06.83 SNORING: ICD-10-CM

## 2020-11-12 DIAGNOSIS — R40.0 HAS DAYTIME DROWSINESS: ICD-10-CM

## 2020-11-12 DIAGNOSIS — G47.33 OSA (OBSTRUCTIVE SLEEP APNEA): ICD-10-CM

## 2020-11-12 DIAGNOSIS — G47.33 OSA (OBSTRUCTIVE SLEEP APNEA): Primary | ICD-10-CM

## 2020-11-12 PROCEDURE — 95810 POLYSOM 6/> YRS 4/> PARAM: CPT

## 2020-11-17 PROBLEM — M25.511 ACUTE PAIN OF RIGHT SHOULDER: Status: ACTIVE | Noted: 2020-11-17

## 2020-11-17 PROBLEM — R10.2 PELVIC PAIN: Status: RESOLVED | Noted: 2020-11-09 | Resolved: 2020-11-17

## 2020-11-17 PROBLEM — N81.4 UTERINE PROLAPSE: Status: RESOLVED | Noted: 2020-10-28 | Resolved: 2020-11-17

## 2020-11-17 PROBLEM — N94.10 DYSPAREUNIA IN FEMALE: Status: RESOLVED | Noted: 2020-10-08 | Resolved: 2020-11-17

## 2020-11-17 PROBLEM — Z01.419 WOMEN'S ANNUAL ROUTINE GYNECOLOGICAL EXAMINATION: Status: RESOLVED | Noted: 2020-10-08 | Resolved: 2020-11-17

## 2020-11-17 PROBLEM — N92.0 MENORRHAGIA WITH REGULAR CYCLE: Status: RESOLVED | Noted: 2020-10-08 | Resolved: 2020-11-17

## 2020-12-01 PROBLEM — N89.8 VAGINAL DISCHARGE: Status: ACTIVE | Noted: 2020-12-01

## 2020-12-22 PROBLEM — L02.32 BOIL OF BUTTOCK: Status: ACTIVE | Noted: 2020-12-22

## 2022-01-15 ENCOUNTER — HOSPITAL ENCOUNTER (EMERGENCY)
Facility: HOSPITAL | Age: 27
Discharge: HOME | End: 2022-01-15
Attending: FAMILY MEDICINE
Payer: MEDICAID

## 2022-01-15 VITALS
WEIGHT: 222 LBS | SYSTOLIC BLOOD PRESSURE: 106 MMHG | TEMPERATURE: 98 F | HEIGHT: 63 IN | BODY MASS INDEX: 39.34 KG/M2 | DIASTOLIC BLOOD PRESSURE: 73 MMHG | OXYGEN SATURATION: 98 % | HEART RATE: 92 BPM | RESPIRATION RATE: 15 BRPM

## 2022-01-15 DIAGNOSIS — S99.912A INJURY OF LEFT ANKLE, INITIAL ENCOUNTER: Primary | ICD-10-CM
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
## 2022-01-15 PROCEDURE — 73610 X-RAY EXAM OF ANKLE: CPT | Mod: 26,LT,, | Performed by: RADIOLOGY

## 2022-01-15 PROCEDURE — 73610 X-RAY EXAM OF ANKLE: CPT | Mod: TC,FY,LT

## 2022-01-15 PROCEDURE — 73610 XR ANKLE COMPLETE 3 VIEW LEFT: ICD-10-PCS | Mod: 26,LT,, | Performed by: RADIOLOGY

## 2022-01-15 PROCEDURE — 99283 EMERGENCY DEPT VISIT LOW MDM: CPT | Mod: 25

## 2022-01-15 NOTE — ED PROVIDER NOTES
Encounter Date: 1/15/2022       History     Chief Complaint   Patient presents with    Ankle Pain     S/p fall     Comes in complaining of left ankle pain. States was standing on children's picnic table and when she stepped off, twisted her ankle.         Review of patient's allergies indicates:  No Known Allergies  Past Medical History:   Diagnosis Date    Anemia     Ovarian cyst      Past Surgical History:   Procedure Laterality Date     SECTION      CYSTOSCOPY N/A 2020    Procedure: CYSTOSCOPY;  Surgeon: Jhonny Rebollar MD;  Location: [facility] OR;  Service: OB/GYN;  Laterality: N/A;    LAPAROSCOPY-ASSISTED VAGINAL HYSTERECTOMY N/A 2020    Procedure: HYSTERECTOMY, VAGINAL, LAPAROSCOPY-ASSISTED;  Surgeon: Jhonny Rebollar MD;  Location: [facility] OR;  Service: OB/GYN;  Laterality: N/A;    TUBAL LIGATION       Family History   Problem Relation Age of Onset    Skin cancer Maternal Grandmother     Diabetes Maternal Grandmother      Social History     Tobacco Use    Smoking status: Never Smoker    Smokeless tobacco: Never Used   Substance Use Topics    Alcohol use: Yes     Comment: occasionally    Drug use: Never     Review of Systems   Constitutional: Negative for fever.   HENT: Negative for sore throat.    Respiratory: Negative for shortness of breath.    Cardiovascular: Negative for chest pain.   Gastrointestinal: Negative for nausea.   Musculoskeletal: Negative for back pain.        Left ankle pain   Skin: Negative for rash and wound.   Neurological: Negative for weakness.   Hematological: Does not bruise/bleed easily.   All other systems reviewed and are negative.      Physical Exam     Initial Vitals [01/15/22 1456]   BP Pulse Resp Temp SpO2   106/73 92 15 98.4 °F (36.9 °C) 98 %      MAP       --         Physical Exam    Nursing note and vitals reviewed.  Constitutional: She appears well-developed and well-nourished.   HENT:   Head: Normocephalic and atraumatic.   Eyes: EOM are normal.   Neck:  Neck supple.   Cardiovascular: Normal rate and regular rhythm.   Pulmonary/Chest: Breath sounds normal. She has no wheezes.   Musculoskeletal:      Cervical back: Neck supple.      Comments: Left ankle with small amount lateral swelling. Joint stable, no laxity     Neurological: She is alert and oriented to person, place, and time.   Skin: Skin is warm and dry. Capillary refill takes less than 2 seconds.   Psychiatric: She has a normal mood and affect.         ED Course   Procedures  Labs Reviewed - No data to display        Splint applied per RN. NV intact after splint application    Imaging Results          X-Ray Ankle Complete Left (Final result)  Result time 01/15/22 15:21:38    Final result by Laura Cee MD (01/15/22 15:21:38)                 Impression:      1. Soft tissue swelling about the left lateral malleolus.  2. Accessory os trigonum ossicle versus age-indeterminate, possibly acute, fracture of the posterior talar process.  Obviously, a recent fracture would require an appropriate mechanism and focal point tenderness over the posterior aspect of the ankle.  If desired, additional evaluation could be achieved with CT of the left ankle.  3. Degenerative change of the hindfoot.      Electronically signed by: Mitch Cee MD  Date:    01/15/2022  Time:    15:21             Narrative:    EXAMINATION:  XR ANKLE COMPLETE 3 VIEW LEFT    CLINICAL HISTORY:  Unspecified fall, initial encounter    TECHNIQUE:  AP, lateral and oblique views of the left ankle were performed.    COMPARISON:  None    FINDINGS:  No talar dome osteochondral lesion.  The ankle mortise width is symmetric.  There is a plantar calcaneal spur.  There is Achilles insertion calcaneal enthesophyte formation.  There is an accessory os trigonum ossicle versus age-indeterminate, possibly acute, displaced fracture of the posterior process of the talus, best appreciated on the lateral radiograph.  Please correlate clinically.  There is mild  soft tissue swelling noted about the left lateral malleolus.  No definite ankle joint effusion.                                 Medications - No data to display                       Clinical Impression:   Final diagnoses:  [W19.XXXA] Fall                 Miranda Gupta, MAOS  01/15/22 1604       Miranda Gupta, AMOS  01/15/22 1605

## 2022-01-15 NOTE — ED TRIAGE NOTES
Pt to ED s/p fall from picnic table c/o L ankle pain and swelling. Pt reports fall was approximately two hours ago, denies any other pain, denies LOC, denies back or neck pain. Ambulates with limp, swelling and tenderness noted to L ankle.

## 2022-08-16 ENCOUNTER — HOSPITAL ENCOUNTER (EMERGENCY)
Facility: HOSPITAL | Age: 27
Discharge: HOME | End: 2022-08-17
Attending: EMERGENCY MEDICINE
Payer: MEDICAID

## 2022-08-16 DIAGNOSIS — T78.40XA ALLERGY, INITIAL ENCOUNTER: Primary | ICD-10-CM

## 2022-08-16 DIAGNOSIS — J02.9 PHARYNGITIS, UNSPECIFIED ETIOLOGY: ICD-10-CM

## 2022-08-16 LAB — SARS-COV-2 RDRP RESP QL NAA+PROBE: NEGATIVE

## 2022-08-16 PROCEDURE — 99284 EMERGENCY DEPT VISIT MOD MDM: CPT

## 2022-08-16 PROCEDURE — U0002 COVID-19 LAB TEST NON-CDC: HCPCS | Performed by: EMERGENCY MEDICINE

## 2022-08-16 NOTE — Clinical Note
"Krystin "Andrew Centeno was seen and treated in our emergency department on 8/16/2022.  She may return to work on 08/18/2022.       If you have any questions or concerns, please don't hesitate to call.      Shashank Saunders MD"

## 2022-08-17 VITALS
BODY MASS INDEX: 38.62 KG/M2 | OXYGEN SATURATION: 99 % | DIASTOLIC BLOOD PRESSURE: 80 MMHG | TEMPERATURE: 98 F | HEIGHT: 63 IN | SYSTOLIC BLOOD PRESSURE: 112 MMHG | HEART RATE: 70 BPM | RESPIRATION RATE: 18 BRPM | WEIGHT: 218 LBS

## 2022-08-17 PROCEDURE — 63600175 PHARM REV CODE 636 W HCPCS: Performed by: EMERGENCY MEDICINE

## 2022-08-17 RX ORDER — PREDNISONE 10 MG/1
60 TABLET ORAL
Status: COMPLETED | OUTPATIENT
Start: 2022-08-17 | End: 2022-08-17

## 2022-08-17 RX ORDER — IBUPROFEN 600 MG/1
600 TABLET ORAL EVERY 6 HOURS PRN
Qty: 30 TABLET | Refills: 0 | Status: SHIPPED | OUTPATIENT
Start: 2022-08-17

## 2022-08-17 RX ORDER — LORATADINE 10 MG/1
10 TABLET ORAL DAILY
Qty: 60 TABLET | Refills: 0 | Status: SHIPPED | OUTPATIENT
Start: 2022-08-17 | End: 2023-08-17

## 2022-08-17 RX ADMIN — PREDNISONE 60 MG: 10 TABLET ORAL at 01:08

## 2022-08-17 NOTE — DISCHARGE INSTRUCTIONS
Follow-up with a primary care physician as necessary, return emergency with any worsening symptomatology to include fevers chills or otherwise.  Take medications as prescribed.

## 2022-08-17 NOTE — ED PROVIDER NOTES
Encounter Date: 2022       History     Chief Complaint   Patient presents with    COVID-19 Concerns     Pt reports sore throat, runny nose and HA that started this AM. No known exposure. No meds PTA     Well-developed 26-year-old female comes emergency concerns of possible COVID.  Positive sore throat.  Positive nasal congestion.  Three day duration.  Multiple people around her have been tested positive for COVID.  She comes in for further evaluation and treatment.  Thought it may be allergies but was concerned of COVID possibilities.        Review of patient's allergies indicates:  No Known Allergies  Past Medical History:   Diagnosis Date    Anemia     Ovarian cyst      Past Surgical History:   Procedure Laterality Date     SECTION      CYSTOSCOPY N/A 2020    Procedure: CYSTOSCOPY;  Surgeon: Jhonny Rebollar MD;  Location: [facility] OR;  Service: OB/GYN;  Laterality: N/A;    LAPAROSCOPY-ASSISTED VAGINAL HYSTERECTOMY N/A 2020    Procedure: HYSTERECTOMY, VAGINAL, LAPAROSCOPY-ASSISTED;  Surgeon: Jhonny Rebollar MD;  Location: [facility] OR;  Service: OB/GYN;  Laterality: N/A;    TUBAL LIGATION       Family History   Problem Relation Age of Onset    Skin cancer Maternal Grandmother     Diabetes Maternal Grandmother      Social History     Tobacco Use    Smoking status: Never Smoker    Smokeless tobacco: Never Used   Substance Use Topics    Alcohol use: Yes     Comment: occasionally    Drug use: Never     Review of Systems   Constitutional: Negative for activity change, fatigue and fever.   HENT: Positive for postnasal drip, sinus pressure, sinus pain and sore throat.    Eyes: Negative.    Respiratory: Positive for cough. Negative for shortness of breath.    Genitourinary: Negative for dysuria.   All other systems reviewed and are negative.      Physical Exam     Initial Vitals [22 2306]   BP Pulse Resp Temp SpO2   (!) 124/92 67 18 97.7 °F (36.5 °C) 100 %      MAP       --         Physical  Exam    Nursing note and vitals reviewed.  Constitutional: She appears well-developed.   HENT:   Head: Normocephalic and atraumatic.   Right Ear: External ear normal.   Left Ear: External ear normal.   Nose: Nose normal.   Mouth/Throat: Oropharynx is clear and moist.   Eyes: EOM are normal. Pupils are equal, round, and reactive to light.   Neck: Neck supple.   Normal range of motion.  Cardiovascular: Normal rate and regular rhythm.   Pulmonary/Chest: Breath sounds normal. No respiratory distress. She has no wheezes. She has no rales.   Abdominal: Abdomen is soft. Bowel sounds are normal.   Musculoskeletal:      Cervical back: Normal range of motion and neck supple.     Lymphadenopathy:     She has no cervical adenopathy.   Neurological: She is alert and oriented to person, place, and time. She has normal strength. GCS score is 15. GCS eye subscore is 4. GCS verbal subscore is 5. GCS motor subscore is 6.   Skin: Skin is warm.   Psychiatric: She has a normal mood and affect. Thought content normal.         ED Course   Procedures  Labs Reviewed   SARS-COV-2 RNA AMPLIFICATION, QUAL    Narrative:     Is the patient symptomatic?->Yes          Imaging Results    None          Medications   predniSONE tablet 60 mg (has no administration in time range)     Medical Decision Making:   Differential Diagnosis:   Pharyngitis, viral pharyngitis, foreign body, esophageal abrasion, esophageal injury, COVID  ED Management:  Patient is stable, COVID negative.  I will treat with a oral dose of steroids as she does not want injection.  Discharged home with ibuprofen for the rest of her symptomatologies.  Also loratadine.                      Clinical Impression:   Final diagnoses:  [T78.40XA] Allergy, initial encounter (Primary)  [J02.9] Pharyngitis, unspecified etiology          ED Disposition Condition    Discharge Stable        ED Prescriptions     Medication Sig Dispense Start Date End Date Auth. Provider    loratadine (CLARITIN) 10  mg tablet Take 1 tablet (10 mg total) by mouth once daily. 60 tablet 8/17/2022 8/17/2023 Shashank Saunders MD    ibuprofen (ADVIL,MOTRIN) 600 MG tablet Take 1 tablet (600 mg total) by mouth every 6 (six) hours as needed for Pain. 30 tablet 8/17/2022  Shashank Saunders MD        Follow-up Information    None          Shashank Saunders MD  08/17/22 5096

## 2022-08-25 ENCOUNTER — HOSPITAL ENCOUNTER (EMERGENCY)
Facility: HOSPITAL | Age: 27
Discharge: HOME | End: 2022-08-25
Attending: EMERGENCY MEDICINE
Payer: MEDICAID

## 2022-08-25 VITALS
SYSTOLIC BLOOD PRESSURE: 113 MMHG | WEIGHT: 218 LBS | RESPIRATION RATE: 16 BRPM | HEART RATE: 75 BPM | BODY MASS INDEX: 38.62 KG/M2 | DIASTOLIC BLOOD PRESSURE: 83 MMHG | OXYGEN SATURATION: 100 % | TEMPERATURE: 98 F | HEIGHT: 63 IN

## 2022-08-25 DIAGNOSIS — R51.9 NONINTRACTABLE EPISODIC HEADACHE, UNSPECIFIED HEADACHE TYPE: Primary | ICD-10-CM

## 2022-08-25 PROCEDURE — 70450 CT HEAD/BRAIN W/O DYE: CPT | Mod: 26,,, | Performed by: RADIOLOGY

## 2022-08-25 PROCEDURE — 96375 TX/PRO/DX INJ NEW DRUG ADDON: CPT

## 2022-08-25 PROCEDURE — 70450 CT HEAD/BRAIN W/O DYE: CPT | Mod: TC

## 2022-08-25 PROCEDURE — 63600175 PHARM REV CODE 636 W HCPCS: Performed by: NURSE PRACTITIONER

## 2022-08-25 PROCEDURE — 70450 CT HEAD WITHOUT CONTRAST: ICD-10-PCS | Mod: 26,,, | Performed by: RADIOLOGY

## 2022-08-25 PROCEDURE — 99285 EMERGENCY DEPT VISIT HI MDM: CPT | Mod: 25

## 2022-08-25 PROCEDURE — 96365 THER/PROPH/DIAG IV INF INIT: CPT

## 2022-08-25 PROCEDURE — 25000003 PHARM REV CODE 250: Performed by: NURSE PRACTITIONER

## 2022-08-25 RX ORDER — ONDANSETRON 4 MG/1
4 TABLET, FILM COATED ORAL EVERY 6 HOURS PRN
Qty: 15 TABLET | Refills: 0 | Status: SHIPPED | OUTPATIENT
Start: 2022-08-25

## 2022-08-25 RX ORDER — DIPHENHYDRAMINE HYDROCHLORIDE 50 MG/ML
25 INJECTION INTRAMUSCULAR; INTRAVENOUS
Status: COMPLETED | OUTPATIENT
Start: 2022-08-25 | End: 2022-08-25

## 2022-08-25 RX ORDER — HYDROCODONE BITARTRATE AND ACETAMINOPHEN 7.5; 325 MG/1; MG/1
1 TABLET ORAL EVERY 4 HOURS PRN
Qty: 18 TABLET | Refills: 0 | Status: SHIPPED | OUTPATIENT
Start: 2022-08-25

## 2022-08-25 RX ORDER — BUTALBITAL, ACETAMINOPHEN AND CAFFEINE 50; 325; 40 MG/1; MG/1; MG/1
1 TABLET ORAL EVERY 4 HOURS PRN
Qty: 30 TABLET | Refills: 1 | Status: SHIPPED | OUTPATIENT
Start: 2022-08-25 | End: 2022-09-24

## 2022-08-25 RX ORDER — KETOROLAC TROMETHAMINE 30 MG/ML
30 INJECTION, SOLUTION INTRAMUSCULAR; INTRAVENOUS
Status: COMPLETED | OUTPATIENT
Start: 2022-08-25 | End: 2022-08-25

## 2022-08-25 RX ADMIN — KETOROLAC TROMETHAMINE 30 MG: 30 INJECTION, SOLUTION INTRAMUSCULAR; INTRAVENOUS at 08:08

## 2022-08-25 RX ADMIN — PROMETHAZINE HYDROCHLORIDE 25 MG: 25 INJECTION INTRAMUSCULAR; INTRAVENOUS at 08:08

## 2022-08-25 RX ADMIN — DIPHENHYDRAMINE HYDROCHLORIDE 25 MG: 50 INJECTION, SOLUTION INTRAMUSCULAR; INTRAVENOUS at 08:08

## 2022-08-25 NOTE — ED NOTES
Dr. Saunders at bedside discussing LP procedure with pt. Pt states she does not want to have procedure done today.

## 2022-08-25 NOTE — ED PROVIDER NOTES
Encounter Date: 2022       History     Chief Complaint   Patient presents with    Headache     Well-developed 27-year-old female comes emergency complaints of 10/10 extreme headache.  Patient has had headaches in the past but this is the worst that she has had.  No neurological deficit.  Does have photophobia.  Patient is neurologically intact.  Past medical history of anemia ovarian cyst.  Approximately 3 years ago she had something that was concerning for papilledema.  However there was no findings on the MRI performed at the time.        Review of patient's allergies indicates:  No Known Allergies  Past Medical History:   Diagnosis Date    Anemia     Ovarian cyst      Past Surgical History:   Procedure Laterality Date     SECTION      CYSTOSCOPY N/A 2020    Procedure: CYSTOSCOPY;  Surgeon: Jhonny Rebollar MD;  Location: [facility] OR;  Service: OB/GYN;  Laterality: N/A;    LAPAROSCOPY-ASSISTED VAGINAL HYSTERECTOMY N/A 2020    Procedure: HYSTERECTOMY, VAGINAL, LAPAROSCOPY-ASSISTED;  Surgeon: Jhonny Rebollar MD;  Location: [facility] OR;  Service: OB/GYN;  Laterality: N/A;    TUBAL LIGATION       Family History   Problem Relation Age of Onset    Skin cancer Maternal Grandmother     Diabetes Maternal Grandmother      Social History     Tobacco Use    Smoking status: Never Smoker    Smokeless tobacco: Never Used   Substance Use Topics    Alcohol use: Yes     Comment: occasionally    Drug use: Never     Review of Systems   Constitutional: Negative.    HENT: Negative.    Eyes: Positive for photophobia.   Respiratory: Negative.    Cardiovascular: Negative.    Gastrointestinal: Negative.    Genitourinary: Negative.    Neurological: Positive for headaches.   All other systems reviewed and are negative.      Physical Exam     Initial Vitals [22 0824]   BP Pulse Resp Temp SpO2   127/85 80 20 98 °F (36.7 °C) 99 %      MAP       --         Physical Exam    Nursing note and vitals  reviewed.  Constitutional: She appears well-developed and well-nourished.   HENT:   Head: Normocephalic and atraumatic.   Eyes: Pupils are equal, round, and reactive to light.   3 mm bilateral.  Equal brisk.   Neck:   Normal range of motion.  Cardiovascular: Normal rate, regular rhythm and normal heart sounds.   Pulmonary/Chest: Breath sounds normal.   Abdominal: Abdomen is soft.   Musculoskeletal:         General: Normal range of motion.      Cervical back: Normal range of motion.     Neurological: She is alert and oriented to person, place, and time. She has normal strength and normal reflexes. GCS score is 15. GCS eye subscore is 4. GCS verbal subscore is 5. GCS motor subscore is 6.   Skin: Skin is warm.   Psychiatric: She has a normal mood and affect. Thought content normal.         ED Course   Procedures  Labs Reviewed - No data to display       Imaging Results          CT Head Without Contrast (Final result)  Result time 08/25/22 09:41:33    Final result by Sweta Amanda MD (08/25/22 09:41:33)                 Impression:      Normal study..      Electronically signed by: Sweta Amanda  Date:    08/25/2022  Time:    09:41             Narrative:    EXAMINATION:  CT HEAD WITHOUT CONTRAST    CLINICAL HISTORY:  Headache, chronic, new features or increased frequency;    TECHNIQUE:  Low dose axial images were obtained through the head.  Coronal and sagittal reformations were also performed. Contrast was not administered.    COMPARISON:  None    FINDINGS:  There is no intra or extra-axial hemorrhage.  No mass effect, edema or midline shift is present.  The ventricular system and cortical sulcal markings are appropriate for the patient's age.  There is no acute or chronic infarction.    There is no skull fracture.  The visualized paranasal sinuses are clear.                                 Medications   diphenhydrAMINE injection 25 mg (25 mg Intravenous Given 8/25/22 2038)   ketorolac injection 30 mg (30 mg  Intravenous Given 8/25/22 0838)   promethazine (PHENERGAN) 25 mg in dextrose 5 % 50 mL IVPB (0 mg Intravenous Stopped 8/25/22 0856)     Medical Decision Making:   Differential Diagnosis:   Meningitis, migraine, brain tumor, increasing intracranial pressure, pseudotumor cerebri, trauma, musculoskeletal, ill fitting glasses, trauma, subarachnoid hemorrhage, subdural hemorrhage, interventricular hemorrhage.  ED Management:  Pain medications to make the patient comfortable.  CT scan to follow-up.    CT scan as no acute abnormalities.  I did ultrasound the patient has ophthalmic nerve.  I found the left ophthalmic nerve to be approximately 7.6 mm across at 3 mm posteriorly as well as the right optic nerve at 6.4 mm across although the anatomy was unusual.  I discussed this case with Dr. Srinivasan Neurology.  He will follow-up with this patient neurology.  I suspect pseudotumor cerebri will be the final diagnosis.  He understands what I am looking for.  I offered a spinal tap to the patient but at this time she says I am scared of needles and would like to wait.  I explained to her the possibility of this becoming worse and what she can possibly do eye damage and to be sure and follow-up.  She understands.  Her mother is present for this conversation.                      Clinical Impression:   Final diagnoses:  [R51.9] Nonintractable episodic headache, unspecified headache type (Primary)          ED Disposition Condition    Discharge         ED Prescriptions     Medication Sig Dispense Start Date End Date Auth. Provider    butalbital-acetaminophen-caffeine -40 mg (FIORICET, ESGIC) -40 mg per tablet Take 1 tablet by mouth every 4 (four) hours as needed for Pain. 30 tablet 8/25/2022 9/24/2022 Shashank Saunders MD    HYDROcodone-acetaminophen (NORCO) 7.5-325 mg per tablet Take 1 tablet by mouth every 4 (four) hours as needed for Pain. 18 tablet 8/25/2022  Shashank Saunders MD    ondansetron (ZOFRAN) 4 MG  tablet Take 1 tablet (4 mg total) by mouth every 6 (six) hours as needed. 15 tablet 8/25/2022  Shashank Saunders MD        Follow-up Information    None          Shashank Saunders MD  08/25/22 7916

## 2022-12-03 NOTE — Clinical Note
"Krystin "Andrew Centeno was seen and treated in our emergency department on 8/25/2022.  She may return to work on 08/29/2022.       If you have any questions or concerns, please don't hesitate to call.      Shashank Saunders MD"
Private Residence

## 2023-12-22 ENCOUNTER — HOSPITAL ENCOUNTER (EMERGENCY)
Facility: HOSPITAL | Age: 28
Discharge: HOME | End: 2023-12-22
Attending: EMERGENCY MEDICINE

## 2023-12-22 VITALS
RESPIRATION RATE: 18 BRPM | HEART RATE: 88 BPM | HEIGHT: 63 IN | SYSTOLIC BLOOD PRESSURE: 132 MMHG | OXYGEN SATURATION: 98 % | WEIGHT: 225 LBS | BODY MASS INDEX: 39.87 KG/M2 | TEMPERATURE: 98 F | DIASTOLIC BLOOD PRESSURE: 85 MMHG

## 2023-12-22 DIAGNOSIS — M43.6 TORTICOLLIS, ACUTE: Primary | ICD-10-CM

## 2023-12-22 LAB
GROUP A STREP, MOLECULAR: NEGATIVE
INFLUENZA A, MOLECULAR: NEGATIVE
INFLUENZA B, MOLECULAR: NEGATIVE
SARS-COV-2 RDRP RESP QL NAA+PROBE: NEGATIVE
SPECIMEN SOURCE: NORMAL

## 2023-12-22 PROCEDURE — 25000003 PHARM REV CODE 250: Performed by: EMERGENCY MEDICINE

## 2023-12-22 PROCEDURE — 99283 EMERGENCY DEPT VISIT LOW MDM: CPT

## 2023-12-22 PROCEDURE — 87502 INFLUENZA DNA AMP PROBE: CPT | Performed by: EMERGENCY MEDICINE

## 2023-12-22 PROCEDURE — U0002 COVID-19 LAB TEST NON-CDC: HCPCS | Performed by: EMERGENCY MEDICINE

## 2023-12-22 PROCEDURE — 87651 STREP A DNA AMP PROBE: CPT | Performed by: EMERGENCY MEDICINE

## 2023-12-22 RX ORDER — CYCLOBENZAPRINE HCL 10 MG
10 TABLET ORAL 3 TIMES DAILY PRN
Qty: 10 TABLET | Refills: 0 | Status: SHIPPED | OUTPATIENT
Start: 2023-12-22 | End: 2024-01-01

## 2023-12-22 RX ORDER — CYCLOBENZAPRINE HCL 5 MG
10 TABLET ORAL
Status: COMPLETED | OUTPATIENT
Start: 2023-12-22 | End: 2023-12-22

## 2023-12-22 RX ORDER — ACETAMINOPHEN 325 MG/1
650 TABLET ORAL
Status: COMPLETED | OUTPATIENT
Start: 2023-12-22 | End: 2023-12-22

## 2023-12-22 RX ORDER — IBUPROFEN 600 MG/1
600 TABLET ORAL
Status: COMPLETED | OUTPATIENT
Start: 2023-12-22 | End: 2023-12-22

## 2023-12-22 RX ADMIN — IBUPROFEN 600 MG: 600 TABLET, FILM COATED ORAL at 07:12

## 2023-12-22 RX ADMIN — ACETAMINOPHEN 650 MG: 325 TABLET ORAL at 07:12

## 2023-12-22 RX ADMIN — CYCLOBENZAPRINE HYDROCHLORIDE 10 MG: 5 TABLET, FILM COATED ORAL at 07:12

## 2023-12-22 NOTE — Clinical Note
"Krystin "Andrew Centeno was seen and treated in our emergency department on 12/22/2023.  She may return to work on 12/26/2023.       If you have any questions or concerns, please don't hesitate to call.      Yasmany Flynn MD"

## 2023-12-22 NOTE — DISCHARGE INSTRUCTIONS
As we discussed, take alternating doses Tylenol and Motrin, and take Flexeril as prescribed.  Do not drive or drink alcohol after taking Flexeril.  Limit your Tylenol intake to 3000 mg daily.  You can take 600 mg of Motrin 3 times daily or 400 mg 4 times daily for the next 5-7 days.  Take Motrin with food to help prevent stomach upset.  A heating pad, warm moist towels, hot shower, etc. may help relax the muscles in your neck as well.  Follow-up with your primary care provider, and return here as needed or if worse in any way.

## 2023-12-22 NOTE — ED PROVIDER NOTES
Encounter Date: 2023       History     Chief Complaint   Patient presents with    Torticollis     Patient reports a stiff neck since last night. No recent injury.     28-year-old female, here from home via private vehicle for evaluation and treatment of left-sided neck pain which developed about 5 or 6 hours ago.  She does not remember straining, does not remember any injury.  Denies any numbness, tingling, or weakness.  No visual changes, no headache.  She has never had anything like this before.  She states that last week her kids were sick with flu and strep.  Patient has a mild sore throat but otherwise denies any symptoms of illness.  She did not tried any Tylenol, Motrin, heating pad, etc. prior to coming here.    The history is provided by the patient.   Neck Pain   This is a new problem. The current episode started 3 to 5 hours ago. The problem occurs constantly. The problem has been unchanged. The pain is associated with nothing. The pain is present in the left side. The quality of the pain is described as aching. The pain does not radiate. The pain is at a severity of 10/10. The symptoms are aggravated by position, twisting and bending. The pain is The same all the time. Pertinent negatives include no photophobia, no visual change, no syncope, no numbness, no headaches, no paresis, no tingling and no weakness. She has tried nothing for the symptoms.     Review of patient's allergies indicates:  No Known Allergies  Past Medical History:   Diagnosis Date    Anemia     Ovarian cyst      Past Surgical History:   Procedure Laterality Date     SECTION      CYSTOSCOPY N/A 2020    Procedure: CYSTOSCOPY;  Surgeon: Jhonny Rebollar MD;  Location: [facility] OR;  Service: OB/GYN;  Laterality: N/A;    LAPAROSCOPY-ASSISTED VAGINAL HYSTERECTOMY N/A 2020    Procedure: HYSTERECTOMY, VAGINAL, LAPAROSCOPY-ASSISTED;  Surgeon: Jhonny Rebollar MD;  Location: [facility] OR;  Service: OB/GYN;  Laterality: N/A;    TUBAL  LIGATION       Family History   Problem Relation Age of Onset    Skin cancer Maternal Grandmother     Diabetes Maternal Grandmother      Social History     Tobacco Use    Smoking status: Never    Smokeless tobacco: Never   Substance Use Topics    Alcohol use: Yes     Comment: occasionally    Drug use: Never     Review of Systems   Constitutional: Negative.    HENT:  Positive for rhinorrhea. Negative for congestion, sore throat and trouble swallowing.    Eyes: Negative.  Negative for photophobia.   Respiratory: Negative.     Cardiovascular: Negative.  Negative for syncope.   Gastrointestinal: Negative.    Endocrine: Negative.    Genitourinary: Negative.    Musculoskeletal:  Positive for neck pain.   Allergic/Immunologic: Negative.    Neurological:  Negative for tingling, weakness, numbness and headaches.   Psychiatric/Behavioral: Negative.         Physical Exam     Initial Vitals [12/22/23 0640]   BP Pulse Resp Temp SpO2   132/85 88 18 97.8 °F (36.6 °C) 98 %      MAP       --         Physical Exam    Nursing note and vitals reviewed.  Constitutional: She appears well-developed and well-nourished. No distress.   HENT:   Head: Normocephalic and atraumatic.   Right Ear: External ear normal.   Left Ear: External ear normal.   Nose: Nose normal.   Mouth/Throat: Oropharynx is clear and moist. No oropharyngeal exudate.   Eyes: Conjunctivae and EOM are normal. Pupils are equal, round, and reactive to light. No scleral icterus.   Neck: Neck supple. No JVD present.   Normal range of motion.  Cardiovascular:  Normal rate, regular rhythm, normal heart sounds and intact distal pulses.           No murmur heard.  Pulmonary/Chest: Breath sounds normal. No stridor. No respiratory distress. She has no wheezes. She has no rhonchi. She has no rales.   Abdominal: Abdomen is soft. Bowel sounds are normal. She exhibits no distension. There is no abdominal tenderness.   Musculoskeletal:         General: No tenderness or edema. Normal range  of motion.      Cervical back: Normal range of motion and neck supple.     Neurological: She is alert and oriented to person, place, and time. She has normal strength. No cranial nerve deficit or sensory deficit. GCS score is 15. GCS eye subscore is 4. GCS verbal subscore is 5. GCS motor subscore is 6.   Skin: Skin is warm and dry. Capillary refill takes less than 2 seconds. No rash noted. No erythema.   Psychiatric: She has a normal mood and affect. Her behavior is normal.         ED Course   Procedures  Labs Reviewed   INFLUENZA A & B BY MOLECULAR   GROUP A STREP, MOLECULAR   SARS-COV-2 RNA AMPLIFICATION, QUAL    Narrative:     Is the patient symptomatic?->No          Imaging Results    None          Medications   acetaminophen tablet 650 mg (650 mg Oral Given 12/22/23 0701)   ibuprofen tablet 600 mg (600 mg Oral Given 12/22/23 0701)   cyclobenzaprine tablet 10 mg (10 mg Oral Given 12/22/23 0701)     Medical Decision Making  Risk  OTC drugs.  Prescription drug management.                                      Clinical Impression:  Final diagnoses:  [M43.6] Torticollis, acute (Primary)          ED Disposition Condition    Discharge Stable          ED Prescriptions       Medication Sig Dispense Start Date End Date Auth. Provider    cyclobenzaprine (FLEXERIL) 10 MG tablet Take 1 tablet (10 mg total) by mouth 3 (three) times daily as needed for Muscle spasms. 10 tablet 12/22/2023 1/1/2024 Yasmany Flynn MD          Follow-up Information       Follow up With Specialties Details Why Contact Info    Your primary care doctor  In 3 days      [facility] Emergency Dept Emergency Medicine  As needed, If symptoms worsen [address] [facility] [address] [phone]             Yasmany Flynn MD  12/22/23 7212

## 2023-12-22 NOTE — ED TRIAGE NOTES
Patient reports that her children had flu and strep last week.Patient awoke with a stiff neck this am. She has not attempted any OTC remedies.

## 2025-05-21 ENCOUNTER — HOSPITAL ENCOUNTER (EMERGENCY)
Facility: HOSPITAL | Age: 30
Discharge: HOME | End: 2025-05-21
Attending: EMERGENCY MEDICINE

## 2025-05-21 VITALS
SYSTOLIC BLOOD PRESSURE: 135 MMHG | WEIGHT: 220 LBS | DIASTOLIC BLOOD PRESSURE: 87 MMHG | BODY MASS INDEX: 36.65 KG/M2 | HEIGHT: 65 IN | TEMPERATURE: 98 F | OXYGEN SATURATION: 99 % | HEART RATE: 86 BPM | RESPIRATION RATE: 18 BRPM

## 2025-05-21 DIAGNOSIS — R52 PAIN: ICD-10-CM

## 2025-05-21 DIAGNOSIS — M79.672 LEFT FOOT PAIN: Primary | ICD-10-CM

## 2025-05-21 PROCEDURE — 73630 X-RAY EXAM OF FOOT: CPT | Mod: 26,LT,, | Performed by: RADIOLOGY

## 2025-05-21 PROCEDURE — 99283 EMERGENCY DEPT VISIT LOW MDM: CPT | Mod: 25

## 2025-05-21 PROCEDURE — 73630 X-RAY EXAM OF FOOT: CPT | Mod: TC,LT

## 2025-05-21 NOTE — ED PROVIDER NOTES
Encounter Date: 2025       History     Chief Complaint   Patient presents with    Foot Pain     X 2 weeks ,no injury      29-year-old female here complaining of left foot pain.  Pain is centered in the heel region.  Denies any trauma.  She states that there is no pain to palpation, but when she puts weight on her left foot, she feels pain in the heel region and the pain radiates into the calf.  Symptoms are worse when she 1st gets up in the morning, or when she gets up after being seated for awhile.  She has not tried any medications or other interventions for the symptoms.  Symptoms have been present for about 2 weeks now.      Review of patient's allergies indicates:  No Known Allergies  Past Medical History:   Diagnosis Date    Anemia     Ovarian cyst      Past Surgical History:   Procedure Laterality Date     SECTION      CYSTOSCOPY N/A 2020    Procedure: CYSTOSCOPY;  Surgeon: Jhonny Rebollar MD;  Location: [facility] OR;  Service: OB/GYN;  Laterality: N/A;    LAPAROSCOPY-ASSISTED VAGINAL HYSTERECTOMY N/A 2020    Procedure: HYSTERECTOMY, VAGINAL, LAPAROSCOPY-ASSISTED;  Surgeon: Jhonny Rebollar MD;  Location: [facility] OR;  Service: OB/GYN;  Laterality: N/A;    TUBAL LIGATION       Family History   Problem Relation Name Age of Onset    Skin cancer Maternal Grandmother      Diabetes Maternal Grandmother       Social History[1]  Review of Systems   Musculoskeletal:  Positive for arthralgias (Left heel pain).   All other systems reviewed and are negative.      Physical Exam     Initial Vitals [25 0913]   BP Pulse Resp Temp SpO2   135/87 86 18 98.4 °F (36.9 °C) 99 %      MAP       --         Physical Exam    Nursing note and vitals reviewed.  Constitutional: She appears well-developed and well-nourished.   HENT:   Head: Normocephalic and atraumatic.   Nose: Nose normal. Mouth/Throat: Oropharynx is clear and moist. No oropharyngeal exudate.   Eyes: Conjunctivae and EOM are normal. Pupils are equal,  round, and reactive to light. No scleral icterus.   Neck: Neck supple. No JVD present.   Normal range of motion.  Cardiovascular:  Normal rate, regular rhythm, normal heart sounds and intact distal pulses.           Pulmonary/Chest: Breath sounds normal. No stridor. No respiratory distress.   Abdominal: Abdomen is soft. She exhibits no distension.   Musculoskeletal:         General: No tenderness or edema. Normal range of motion.      Cervical back: Normal range of motion and neck supple.      Comments: Examination of the left foot reveals no evidence of trauma, no ecchymosis, erythema, or edema.  No tenderness to palpation, normal range of motion without tenderness.  Normal pulses, normal sensory.      Neurological: She is alert and oriented to person, place, and time. She has normal strength. No cranial nerve deficit or sensory deficit. GCS score is 15. GCS eye subscore is 4. GCS verbal subscore is 5. GCS motor subscore is 6.   Skin: Skin is warm and dry. Capillary refill takes less than 2 seconds. No rash noted. No erythema.   Psychiatric: She has a normal mood and affect. Her behavior is normal.         ED Course   Procedures  Labs Reviewed - No data to display       Imaging Results              X-Ray Foot Complete Left (Final result)  Result time 05/21/25 10:01:57      Final result by Sweta Amanda MD (05/21/25 10:01:57)                   Impression:      Soft tissue swelling with no acute bony abnormality.      Electronically signed by: Sweta Amanda  Date:    05/21/2025  Time:    10:01               Narrative:    EXAMINATION:  XR FOOT COMPLETE 3 VIEW LEFT    CLINICAL HISTORY:  .  Pain, unspecified    TECHNIQUE:  AP, lateral and oblique views of the left foot were performed.    COMPARISON:  None    FINDINGS:  There is no fracture, dislocation or radiopaque foreign body.  There is a large heel spur.  There is an accessory ossicle posterior to the talus.  There is soft tissue swelling over the  dorsum of the foot.                                    X-Rays:   Independently Interpreted Readings:   Other Readings:  X-ray left foot personally reviewed by me is negative for any evidence of fracture, dislocation, or foreign body.    Medications - No data to display  Medical Decision Making  Differential includes fracture, dislocation, sprain, strain, plantar fasciitis, other inflammatory process, etc..    Her symptoms suggest plantar fasciitis.  We discussed home treatments such as ice, stretching, shoe inserts, oral anti-inflammatories, etc..  Patient will try these intervention at home for the next 2 weeks.  If she does not see any improvement during that time, patient will follow-up with either Podiatry or Orthopedics.  She will return here for any worsening signs or symptoms.     Amount and/or Complexity of Data Reviewed  Radiology: ordered.                                      Clinical Impression:  Final diagnoses:  [R52] Pain  [M79.672] Left foot pain (Primary)          ED Disposition Condition    Discharge Stable          ED Prescriptions    None       Follow-up Information       Follow up With Specialties Details Why Contact Info    Kee Diego DPM Podiatry  As needed [address] [facility] [place] [address] [phone]      Tyler Daniel MD Orthopedic Surgery  As needed [address] [facility] [place] [address]  [phone]                     [1]   Social History  Tobacco Use    Smoking status: Never    Smokeless tobacco: Never   Substance Use Topics    Alcohol use: Yes     Comment: occasionally    Drug use: Never        Yasmany Flynn MD  05/21/25 [id number]

## 2025-05-21 NOTE — DISCHARGE INSTRUCTIONS
The symptoms you are having suggest a condition called plantar fasciitis.  Use ice, stretching, anti-inflammatory medications, and other treatments as we discussed.  If symptoms are still present after 2 weeks of home treatments, I would suggest following up with either Dr. Diego or Dr. Daniel.  For over-the-counter medications, you can take it 100 mg ibuprofen 3 times daily for the next week.  You can take Tylenol in between doses of the ibuprofen.  Take the ibuprofen with food to help prevent stomach upset.  Return here as needed or if worse.

## 2025-05-21 NOTE — Clinical Note
"Krystin "Andrew Centeno was seen and treated in our emergency department on 5/21/2025.  She may return to work on 05/24/2025.       If you have any questions or concerns, please don't hesitate to call.      Yasmany Flynn MD"

## (undated) DEVICE — DEVICE FORNISEE 30MM CERV CUP

## (undated) DEVICE — SEE MEDLINE ITEM 156964

## (undated) DEVICE — UNDERGLOVES BIOGEL PI SZ 7 LF

## (undated) DEVICE — SOL 9P NACL IRR PIC IL

## (undated) DEVICE — SYR 50ML CATH TIP

## (undated) DEVICE — SUT 0 36IN COATED VICRYL VI

## (undated) DEVICE — SOL IRR NACL .9% 3000ML

## (undated) DEVICE — PAD SANITARY OB STERILE

## (undated) DEVICE — GLOVE SURG ULTRA TOUCH 7.5

## (undated) DEVICE — SUT CTD VICRYL 0 VIL BR/CT

## (undated) DEVICE — FILTER LAPAROSCOPIC SMOKE EVAC

## (undated) DEVICE — GLOVE SURGEONS ULTRA TOUCH 6.5

## (undated) DEVICE — APPLICATOR FLOSEAL ENDO 35CM

## (undated) DEVICE — PACK DRAPE PERI/GYN TIBURON

## (undated) DEVICE — GLOVE SURG ULTRA TOUCH 7

## (undated) DEVICE — IRRIGATOR SUCTION W/TIP

## (undated) DEVICE — SEE MEDLINE ITEM 157181

## (undated) DEVICE — TUBING HEATED INSUFFLATOR

## (undated) DEVICE — SUT 0 VICRYL / UR6 (J603)

## (undated) DEVICE — SUT CTD VICRYL 4-0 BR PS-2

## (undated) DEVICE — Device

## (undated) DEVICE — MATRIX HEMOSTATIC FLOSEAL 5ML

## (undated) DEVICE — SPATULA CURVED 33CM HC BLACK

## (undated) DEVICE — TRAY CATH URETHRAL FOLEY 16FR

## (undated) DEVICE — CANISTER SUCTION 3000CC

## (undated) DEVICE — DECANTER VIAL ASEPTIC TRANSFER

## (undated) DEVICE — ELECTRODE REM PLYHSV RETURN 9

## (undated) DEVICE — TROCAR ENDOPATH XCEL 11MM 10CM

## (undated) DEVICE — SOL CLEARIFY VISUALIZATION LAP

## (undated) DEVICE — SEALER LIGASURE LAP 37CM 5MM

## (undated) DEVICE — CANNULA LAP SEAL Z THRD 5X100

## (undated) DEVICE — TRAY DRY SKIN SCRUB PREP

## (undated) DEVICE — DRAPE ABDOMINAL TIBURON 14X11